# Patient Record
Sex: FEMALE | Race: WHITE
[De-identification: names, ages, dates, MRNs, and addresses within clinical notes are randomized per-mention and may not be internally consistent; named-entity substitution may affect disease eponyms.]

---

## 2017-04-23 ENCOUNTER — HOSPITAL ENCOUNTER (INPATIENT)
Dept: HOSPITAL 25 - ED | Age: 72
LOS: 2 days | Discharge: HOME | DRG: 188 | End: 2017-04-25
Attending: INTERNAL MEDICINE | Admitting: HOSPITALIST
Payer: MEDICARE

## 2017-04-23 DIAGNOSIS — Z91.013: ICD-10-CM

## 2017-04-23 DIAGNOSIS — J90: Primary | ICD-10-CM

## 2017-04-23 DIAGNOSIS — C50.919: ICD-10-CM

## 2017-04-23 DIAGNOSIS — Z80.1: ICD-10-CM

## 2017-04-23 DIAGNOSIS — Z82.5: ICD-10-CM

## 2017-04-23 DIAGNOSIS — E03.9: ICD-10-CM

## 2017-04-23 DIAGNOSIS — I10: ICD-10-CM

## 2017-04-23 DIAGNOSIS — Z79.899: ICD-10-CM

## 2017-04-23 DIAGNOSIS — E86.0: ICD-10-CM

## 2017-04-23 DIAGNOSIS — Z88.1: ICD-10-CM

## 2017-04-23 DIAGNOSIS — Z91.09: ICD-10-CM

## 2017-04-23 DIAGNOSIS — Z87.891: ICD-10-CM

## 2017-04-23 DIAGNOSIS — R00.0: ICD-10-CM

## 2017-04-23 DIAGNOSIS — J44.9: ICD-10-CM

## 2017-04-23 LAB
ADD DIFF/SLIDE REVIEW?: (no result)
ALBUMIN SERPL BCG-MCNC: 3.6 G/DL (ref 3.2–5.2)
ALP SERPL-CCNC: 73 U/L (ref 34–104)
ALT SERPL W P-5'-P-CCNC: 17 U/L (ref 7–52)
ANION GAP SERPL CALC-SCNC: 9 MMOL/L (ref 2–11)
AST SERPL-CCNC: 24 U/L (ref 13–39)
BUN SERPL-MCNC: 19 MG/DL (ref 6–24)
BUN/CREAT SERPL: 20 (ref 8–20)
CALCIUM SERPL-MCNC: 9.3 MG/DL (ref 8.6–10.3)
CHLORIDE SERPL-SCNC: 99 MMOL/L (ref 101–111)
GLOBULIN SER CALC-MCNC: 3.1 G/DL (ref 2–4)
GLUCOSE SERPL-MCNC: 163 MG/DL (ref 70–100)
HCO3 SERPL-SCNC: 29 MMOL/L (ref 22–32)
HCT VFR BLD AUTO: 33 % (ref 35–47)
HGB BLD-MCNC: 11.4 G/DL (ref 12–16)
MCH RBC QN AUTO: 35 PG (ref 27–31)
MCHC RBC AUTO-ENTMCNC: 34 G/DL (ref 31–36)
MCV RBC AUTO: 101 FL (ref 80–97)
POTASSIUM SERPL-SCNC: 2.9 MMOL/L (ref 3.5–5)
PROT SERPL-MCNC: 6.7 G/DL (ref 6.4–8.9)
RBC # BLD AUTO: 3.29 10^6/UL (ref 4–5.4)
SODIUM SERPL-SCNC: 137 MMOL/L (ref 133–145)
WBC # BLD AUTO: 7.7 10^3/UL (ref 3.5–10.8)

## 2017-04-23 PROCEDURE — G0378 HOSPITAL OBSERVATION PER HR: HCPCS

## 2017-04-23 PROCEDURE — 88342 IMHCHEM/IMCYTCHM 1ST ANTB: CPT

## 2017-04-23 PROCEDURE — 99232 SBSQ HOSP IP/OBS MODERATE 35: CPT

## 2017-04-23 PROCEDURE — 84157 ASSAY OF PROTEIN OTHER: CPT

## 2017-04-23 PROCEDURE — 83615 LACTATE (LD) (LDH) ENZYME: CPT

## 2017-04-23 PROCEDURE — 85025 COMPLETE CBC W/AUTO DIFF WBC: CPT

## 2017-04-23 PROCEDURE — 71010: CPT

## 2017-04-23 PROCEDURE — 94640 AIRWAY INHALATION TREATMENT: CPT

## 2017-04-23 PROCEDURE — 80048 BASIC METABOLIC PNL TOTAL CA: CPT

## 2017-04-23 PROCEDURE — 93005 ELECTROCARDIOGRAM TRACING: CPT

## 2017-04-23 PROCEDURE — 89051 BODY FLUID CELL COUNT: CPT

## 2017-04-23 PROCEDURE — 83986 ASSAY PH BODY FLUID NOS: CPT

## 2017-04-23 PROCEDURE — 88305 TISSUE EXAM BY PATHOLOGIST: CPT

## 2017-04-23 PROCEDURE — 87040 BLOOD CULTURE FOR BACTERIA: CPT

## 2017-04-23 PROCEDURE — 87070 CULTURE OTHR SPECIMN AEROBIC: CPT

## 2017-04-23 PROCEDURE — 87205 SMEAR GRAM STAIN: CPT

## 2017-04-23 PROCEDURE — 88112 CYTOPATH CELL ENHANCE TECH: CPT

## 2017-04-23 PROCEDURE — 94760 N-INVAS EAR/PLS OXIMETRY 1: CPT

## 2017-04-23 PROCEDURE — 32555 ASPIRATE PLEURA W/ IMAGING: CPT

## 2017-04-23 PROCEDURE — 36415 COLL VENOUS BLD VENIPUNCTURE: CPT

## 2017-04-23 PROCEDURE — 99239 HOSP IP/OBS DSCHRG MGMT >30: CPT

## 2017-04-23 PROCEDURE — 83735 ASSAY OF MAGNESIUM: CPT

## 2017-04-23 PROCEDURE — 80053 COMPREHEN METABOLIC PANEL: CPT

## 2017-04-23 PROCEDURE — 85610 PROTHROMBIN TIME: CPT

## 2017-04-23 PROCEDURE — 88341 IMHCHEM/IMCYTCHM EA ADD ANTB: CPT

## 2017-04-23 PROCEDURE — 71020: CPT

## 2017-04-23 PROCEDURE — 83930 ASSAY OF BLOOD OSMOLALITY: CPT

## 2017-04-23 RX ADMIN — ALBUTEROL SULFATE PRN MG: 2.5 SOLUTION RESPIRATORY (INHALATION) at 15:20

## 2017-04-23 RX ADMIN — MOMETASONE FUROATE SCH PUFF: 220 INHALANT RESPIRATORY (INHALATION) at 19:30

## 2017-04-23 RX ADMIN — SODIUM CHLORIDE SCH MLS/HR: 900 IRRIGANT IRRIGATION at 15:52

## 2017-04-23 RX ADMIN — BENZONATATE PRN MG: 100 CAPSULE ORAL at 20:06

## 2017-04-23 RX ADMIN — HEPARIN SODIUM SCH UNITS: 5000 INJECTION INTRAVENOUS; SUBCUTANEOUS at 21:33

## 2017-04-23 NOTE — HP
*** AMENDED REPORT NOW INCLUDES COSIGNER ***



HISTORY AND PHYSICAL:

 

DATE OF ADMISSION:  17

 

PRIMARY CARE PROVIDER:  Dr. Bains.

 

CONSULTING HEMATOLOGIST:  Dr. Pearson.



ATTENDING PHYSICIAN:  David Pickering MD * (dictated by ROXANNA Valdivia PP

 

CHIEF COMPLAINT:

1.  Shortness of breath.

2.  Cough.

 

HISTORY OF PRESENT ILLNESS:  Ms. Stewart is a 72-year-old female patient who has a 
history of hypothyroidism, hypertension, breast cancer with recurrence, and 
pleural effusion.  She has a history of COPD as well.  The patient comes in 
today and says since February she has been having progressive worsening 
shortness of breath, but over the last 3 days, she noticed that it was much 
worse.  On the , she was here.  A PowerPort  was placed and she was started 
on IV chemotherapy again for recurrence of breast cancer in the form of Taxol.  
She underwent the therapy, did well; however, over the last 48 to 72 hours, she 
has had progressive worsening shortness of breath.  She has been coughing.  She 
has noticed that with minimal exertion, just standing up, she becomes short of 
breath.  She was in close contact with Dr. Pearson.  She touched base with him 
today and he was concerned and felt that the patient should be evaluated in the 
ER and possibly set up for thoracentesis. The patient states that she has not 
been having any fevers or chills.  She denied having any chest discomfort.  She 
denied having any abdominal discomfort and denied having any chest pain, and 
again, her biggest complaint was shortness of breath and there was no 
orthopnea.  She came to the ER.  She was evaluated.  Chest x-ray did show 
increasing size of pleural effusion and the hospitalist service was asked to 
evaluate for admission.

 

PAST MEDICAL HISTORY:  Significant for:

1.  Hypothyroidism.

2.  Hypertension.

3.  Breast cancer.

4.  COPD.

5.  Pleural effusion.

 

PAST SURGICAL HISTORY:

1.  She has had a mastectomy.

2.  .

 

HOME MEDICATIONS:  Include:

1.  Zofran 4 mg every 6 hours as needed.

2.  Asmanex 1 puff inhaled b.i.d.

3.  Albuterol 1 neb inhaled q.i.d.

4.  Dyazide 1 capsule p.o. daily.

5.  Metoprolol 25 mg daily.

6.  Synthroid 100 mcg daily.

7.  Albuterol 2 puffs every 4 hours as needed.

 

ALLERGIES TO MEDICATIONS:  Include SHELLFISH, LEVAQUIN, and DARVOCET.

 

FAMILY HISTORY:  Mother had a history of lung cancer.  Father had a history of 
CHF.

 

SOCIAL HISTORY:  She is a former smoker.  She quit over 40 years ago.  She does 
not drink alcohol.  Surrogate decision maker is her  and daughter.

 

REVIEW OF SYSTEMS:  There is no documented fever.  She denied any weight 
change. No double vision.  No ear discharge.  No rhinorrhea.  No sore throat.  
There is a cough that is productive of white sputum.  She denies having any 
again chest discomfort.  No abdominal pain.  No nausea.  No vomiting.  No 
dysuria.  No frequency.  She denied having any loss of consciousness.  Review 
of 14 systems completed, all others negative.

 

                               PHYSICAL EXAMINATION

 

GENERAL:  At this time, Ms. Stewart is a 72-year-old female patient.  She is 
sitting in the ER stretcher.  She does not appear to be in any acute 
respiratory distress. She is awake and she is alert.

 

VITAL SIGNS:  Blood pressure 134/77, pulse of 114, respirations 20, O2 sat 98%, 
and her temperature was 97.4.

 

HEENT:  Head atraumatic.  Eyes:  EOMs intact.  Sclerae are anicteric and not 
pale. Throat:  Oral mucosa appears to be moist.  No oropharyngeal erythema.

 

NECK:  Supple.

 

LUNGS:  She was diminished in the bases.  Equal diaphragmatic expansion.

 

HEART:  Sounds S1, S2.  Regular rate and rhythm.  She is tachycardic.

 

ABDOMEN:  Soft, flat, nontender.  Bowel sounds were present.

 

EXTREMITIES:  Pulses were 2+ throughout.  She is able to move all 4 extremities 
with 5/5 strength.

 

NEUROLOGIC:  The patient is awake, she is alert, and she is oriented x3.  
Tongue midline.   are equal.  No gross focal deficits.

 

SKIN:  Intact.

 

 LABORATORY DATA AND DIAGNOSTIC STUDIES:  Today are pending, but she did have 
labs on the , revealed WBC of 8.0, RBC of 3.36, hemoglobin 11.8, hematocrit 
34, platelet count of 358.  The sodium was 138, potassium 3.2, chloride of 102, 
bicarb 26,  BUN 15, creatinine of 0.87, glucose 122.

 

There was a chest x-ray obtained today, which impression read increasing 
pleural effusion, Port-A-Cath in place.  Bilateral pleural effusions were noted 
which are increasing in size since  exam.

 

She had a CTA of the chest on  which showed no evidence of PE. 
Bronchiectasis in the anterior right upper lobe.  Bilateral pleural effusions 
are present.  Left effusion appears to be new since previous exam.  Right lung 
base laterally is new consolidated soft tissue for which an underlying mass is 
nonexclusive, measuring up to 2.1 cm.  Old medical records were reviewed.

 

ASSESSMENT AND PLAN:  Ms. Stewart is a 72-year-old female patient coming in to the 
ER today with progressive worsening shortness of breath over the last 48 to 72 
hours. On evaluation today, it was noted that pleural effusions were increasing 
in size and we were asked to evaluate for admission.  She will be admitted 
under observation status for:

 

1.  Pleural effusion:  At this point, I did set up for a thoracentesis.  I will 
send the pleural fluid off for studies.  We will check for protein, LDH, pH; in 
addition to this, cultures and cell count.  We should send that off for 
cytology. Dr. Pearson will be evaluating the patient tomorrow and they could send 
that off for any staining that they would want done.  For the time being, we 
will follow.

2.  Tachycardia:  The etiology is unclear.  It could be dehydration.  She 
recently had chemo.  She has been taking her Dyazide as prescribed.  So, I am 
going to give her fluids.  I am waiting for labs today.  We will get a CBC and 
a CMP to start, and I am trying to get an EKG as well.

3.  Hypothyroidism:  Continue her Synthroid.

4.  Hypertension:  Continue her metoprolol XL only and I am going to hold the 
Dyazide.

5.  Breast cancer:  Again, Dr. Pearson will be evaluating.

6.  Question of chronic obstructive pulmonary disease:  She is on Asmanex and 
standing nebs.  We will continue.

7.  DVT prophylaxis:  She is high risk.  I will place her on heparin subcu.

8.  Code status:  She wishes to be a full code.

9.  Fluids, electrolytes, and nutrition:  She can have a regular diet and 
n.p.o. after midnight.

 

TIME SPENT:  Time spent on the admission was approximately 60 minutes; greater 
than half the time was spent face-to-face with the patient obtaining my history 
and physical, other half the time spent going over the plan of care with the 
patient and implementing plan of care.  I did discuss the plan of care with my 
attending, Dr. Pickering; he is in agreement.

 

 ____________________________________ FIGUEROA SANTOS NP



CC:  Dr. Bians; Dr. Pearson*

 

[id number]/CPS #: [id number]

[facility]

## 2017-04-23 NOTE — ED
Shortness of Breath





- HPI Summary


HPI Summary: 





71 y/o patient with h/o SOB, breast CA currently undergoing chemo, recent port 

placement  with increasing SOB x 1 week, patient unable to walk across room 

without feeling weak/ SOB,   no chest pain, unable to take deep breath, only 

shallow breathing, + coughing productive with white phlegm.  no fever, chills.  

+ wekaness fatigue from chemo.   no tob use.   spoke with Dr. Pearson this AM.   





- History of Current Complaint


Chief Complaint: EDShortnessOfBreath


Time Seen by Provider: 17 10:28


Hx Obtained From: Patient


Onset/Duration: Gradual Onset, Lasting Weeks, Still Present, Worse Since - past 

week


Dyspnea At: Exertion


Aggrevating Factors: Movement, Deep Breaths


Alleviating Factors: Bronchodilators, Oxygen, Upright Position


Associated Signs & Symptoms: Cough (Productive)





- Risk Factors


Cardiac: Negative





- Allergy/Home Medications


Allergies/Adverse Reactions: 


 Allergies











Allergy/AdvReac Type Severity Reaction Status Date / Time


 


Propoxyphene [From Darvon] Allergy Intermediate Hives Verified 17 10:05


 


Shellfish Allergy Allergy Intermediate Hives Verified 17 10:05


 


Levofloxacin Allergy  Unknown Verified 17 10:05





   Reaction  





   Details  











Home Medications: 


 Home Medications





Mometasone 220 MCG MDI * [Asmanex 220 MCG MDI *] 1 puff INH BID 17 [

History Confirmed 17]


Ondansetron TAB* [Zofran 4 MG Tab*] 4 mg PO Q6H PRN 17 [History Confirmed 

17]











PMH/Surg Hx/FS Hx/Imm Hx


Previously Healthy: No - chem tx for breast CA, prior radiation  


Endocrine/Hematology History: Reports: Hx Thyroid Disease


   Denies: Hx Diabetes


Cardiovascular History: Reports: Hx Hypertension


Respiratory History: Reports: Hx Pleural Effusion, Hx Pneumonia - several times 

after chemo, Other Respiratory Problems/Disorders - BREAST CA. PLEURAL EFFUSION


 History: 


   Denies: Hx Renal Disease


Sensory History: Reports: Hx Contacts or Glasses - reading


Opthamlomology History: Reports: Hx Contacts or Glasses - reading


Psychiatric History: 


   Comment Only: Other Psychiatric Issues/Disorders - clostrophobic





- Cancer History


Cancer Type, Location and Year: Right Breast, , Dr. Mao Bajwa Chemotherapy: Yes


Hx Radiation Therapy: Yes


Hx Palliative Cancer Treatment: No





- Surgical History


Surgery Procedure, Year, and Place: rt mastectomy ; 


Hx Anesthesia Reactions: No


Infectious Disease History: 


   Denies: History Other Infectious Disease, Traveled Outside the [place] in Last 30 

Days





- Family History


Known Family History: Positive: None





- Social History


Alcohol Use: None


Substance Use Type: Reports: None


Smoking Status (MU): Former Smoker


Have You Smoked in the Last Year: No





Review of Systems


Constitutional: Negative


Eyes: Negative


ENT: Negative


Cardiovascular: Negative


Positive: Shortness Of Breath, Cough


Gastrointestinal: Negative


Genitourinary: Negative


Musculoskeletal: Negative


Skin: Negative


Positive: Weakness


Psychological: Normal


All Other Systems Reviewed And Are Negative: Yes





Physical Exam


Triage Information Reviewed: Yes


Vital Signs On Initial Exam: 


 Initial Vitals











Temp Pulse Resp BP Pulse Ox


 


 97.4 F   117   16   117/69   95 


 


 17 10:06  17 10:06  17 10:06  17 10:06  17 10:06











Vital Signs Reviewed: Yes


Appearance: Positive: No Pain Distress, Well-Nourished, Ill-Appearing, Thin


Skin: Positive: Warm, Skin Color Reflects Adequate Perfusion


Head/Face: Positive: Normal Head/Face Inspection


Eyes: Positive: Normal, EOMI, ARIANE


ENT: Positive: Normal ENT inspection


Neck: Positive: Supple, Nontender, No Lymphadenopathy


Respiratory/Lung Sounds: Positive: Clear to Auscultation, Other - decreased 

breath sounds b/l LLs, some wheezing/ crackles RAINER


Cardiovascular: Positive: Normal, Pulses are Symmetrical in both Upper and 

Lower Extremities, Tachycardia - mild


Musculoskeletal: Positive: Normal, Strength/ROM Intact - grossly intact


Neurological: Positive: Normal, Sensory/Motor Intact, Alert, Oriented to Person 

Place, Time, Facial Symmetry, Speech Normal


Psychiatric: Positive: Normal


AVPU Assessment: Alert





- [place] Coma Scale


Coma Scale Total: 15





Diagnostics





- Vital Signs


 Vital Signs











  Temp Pulse Resp BP Pulse Ox


 


 17 10:26  97.4 F  114  18  117/69  94


 


 17 10:06  97.4 F  117  16  117/69  95














- Laboratory


Lab Statement: Any lab studies that have been ordered have been reviewed, and 

results considered in the medical decision making process.





Course/Dx





- Course


Course Of Treatment: CXR + for increasing b/l pleural effusions, discussed with 

DR. Pearson, admit overnight, chest tube likely in AM, admitted to hospitalist





- Diagnoses


Provider Diagnoses: 


 Pleural effusion, bilateral








Discharge





- Discharge Plan


Condition: Guarded


Disposition: ADMITTED TO [facility]

## 2017-04-23 NOTE — RAD
Indication: Shortness of breath, port placement.



2 views of the chest including dual energy PA views demonstrate no mediastinal shift.

Heart is of normal size and configuration. Bilateral pleural effusions are noted which is

increasing since March 28, 2017. No pneumothorax is noted. Chronic interstitial disease is

noted.



IMPRESSION: Increasing pleural effusion. Portacatheter in place.

## 2017-04-24 LAB
ANION GAP SERPL CALC-SCNC: 10 MMOL/L (ref 2–11)
BUN SERPL-MCNC: 20 MG/DL (ref 6–24)
BUN/CREAT SERPL: 29 (ref 8–20)
CALCIUM SERPL-MCNC: 8.8 MG/DL (ref 8.6–10.3)
CHLORIDE SERPL-SCNC: 103 MMOL/L (ref 101–111)
GLUCOSE SERPL-MCNC: 144 MG/DL (ref 70–100)
HCO3 SERPL-SCNC: 24 MMOL/L (ref 22–32)
HCT VFR BLD AUTO: 31 % (ref 35–47)
HGB BLD-MCNC: 10.7 G/DL (ref 12–16)
MCH RBC QN AUTO: 35 PG (ref 27–31)
MCHC RBC AUTO-ENTMCNC: 34 G/DL (ref 31–36)
MCV RBC AUTO: 101 FL (ref 80–97)
POTASSIUM SERPL-SCNC: 3.3 MMOL/L (ref 3.5–5)
RBC # BLD AUTO: 3.1 10^6/UL (ref 4–5.4)
SODIUM SERPL-SCNC: 137 MMOL/L (ref 133–145)
WBC # BLD AUTO: 6.7 10^3/UL (ref 3.5–10.8)

## 2017-04-24 PROCEDURE — 0W9B3ZX DRAINAGE OF LEFT PLEURAL CAVITY, PERCUTANEOUS APPROACH, DIAGNOSTIC: ICD-10-PCS

## 2017-04-24 RX ADMIN — BENZONATATE PRN MG: 100 CAPSULE ORAL at 00:35

## 2017-04-24 RX ADMIN — ALBUTEROL SULFATE SCH: 2.5 SOLUTION RESPIRATORY (INHALATION) at 07:53

## 2017-04-24 RX ADMIN — ALBUTEROL SULFATE PRN MG: 2.5 SOLUTION RESPIRATORY (INHALATION) at 11:51

## 2017-04-24 RX ADMIN — HEPARIN SODIUM SCH UNITS: 5000 INJECTION INTRAVENOUS; SUBCUTANEOUS at 15:42

## 2017-04-24 RX ADMIN — SODIUM CHLORIDE SCH: 900 IRRIGANT IRRIGATION at 07:45

## 2017-04-24 RX ADMIN — BENZONATATE PRN MG: 100 CAPSULE ORAL at 20:59

## 2017-04-24 RX ADMIN — MOMETASONE FUROATE SCH PUFF: 220 INHALANT RESPIRATORY (INHALATION) at 21:03

## 2017-04-24 RX ADMIN — MOMETASONE FUROATE SCH PUFF: 220 INHALANT RESPIRATORY (INHALATION) at 09:28

## 2017-04-24 RX ADMIN — ALBUTEROL SULFATE SCH MG: 2.5 SOLUTION RESPIRATORY (INHALATION) at 15:08

## 2017-04-24 RX ADMIN — HEPARIN SODIUM SCH UNITS: 5000 INJECTION INTRAVENOUS; SUBCUTANEOUS at 20:59

## 2017-04-24 RX ADMIN — BENZONATATE PRN MG: 100 CAPSULE ORAL at 12:57

## 2017-04-24 RX ADMIN — ALBUTEROL SULFATE SCH MG: 2.5 SOLUTION RESPIRATORY (INHALATION) at 21:03

## 2017-04-24 RX ADMIN — METOPROLOL SUCCINATE SCH MG: 25 TABLET, EXTENDED RELEASE ORAL at 14:53

## 2017-04-24 RX ADMIN — ALBUTEROL SULFATE SCH: 2.5 SOLUTION RESPIRATORY (INHALATION) at 11:44

## 2017-04-24 RX ADMIN — POTASSIUM CHLORIDE SCH MEQ: 1500 TABLET, EXTENDED RELEASE ORAL at 20:59

## 2017-04-24 RX ADMIN — HEPARIN SODIUM SCH UNITS: 5000 INJECTION INTRAVENOUS; SUBCUTANEOUS at 06:01

## 2017-04-24 RX ADMIN — LEVOTHYROXINE SODIUM SCH MCG: 100 TABLET ORAL at 06:02

## 2017-04-24 NOTE — RAD
INDICATION: Left thoracentesis     



COMPARISON: April 23, 2017

 

TECHNIQUE: PA dual-energy views were obtained.



FINDINGS: 



Bones/Soft Tissues: There are no acute bony findings. There is a left-sided PowerPort

catheter, unchanged. There is right mastectomy



Cardiomediastinal: The heart is normal in size. 



Lungs: There is persistent mild volume loss in right hemothorax. There is no left-sided

pneumothorax post thoracentesis.



Pleura: There are small bilateral pleural effusions. Left pleural effusion is smaller post

thoracentesis. 



Other: None



IMPRESSION: Left-sided effusion is smaller. There is no evidence of pneumothorax. The

examination is otherwise unchanged

## 2017-04-24 NOTE — RAD
Indication: Left pleural effusion.



Real-time sonography of the left chest was performed.



Using usual aseptic technique and lidocaine as local anesthetic the left pleural space was

entered with a 5-Ind[place] catheter. Approximately 900 mL of frances fluid was aspirated.



IMPRESSION: Successful aspiration of 900 mL of frances fluid from the left hemithorax.

## 2017-04-25 VITALS — SYSTOLIC BLOOD PRESSURE: 108 MMHG | DIASTOLIC BLOOD PRESSURE: 52 MMHG

## 2017-04-25 LAB
ANION GAP SERPL CALC-SCNC: 9 MMOL/L (ref 2–11)
BUN SERPL-MCNC: 16 MG/DL (ref 6–24)
BUN/CREAT SERPL: 23.9 (ref 8–20)
CALCIUM SERPL-MCNC: 8.6 MG/DL (ref 8.6–10.3)
CHLORIDE SERPL-SCNC: 104 MMOL/L (ref 101–111)
GLUCOSE SERPL-MCNC: 117 MG/DL (ref 70–100)
HCO3 SERPL-SCNC: 23 MMOL/L (ref 22–32)
HCT VFR BLD AUTO: 30 % (ref 35–47)
HGB BLD-MCNC: 10.3 G/DL (ref 12–16)
Lab: 286 MOSM/KG
MAGNESIUM SERPL-MCNC: 1.8 MG/DL (ref 1.9–2.7)
MCH RBC QN AUTO: 35 PG (ref 27–31)
MCHC RBC AUTO-ENTMCNC: 35 G/DL (ref 31–36)
MCV RBC AUTO: 101 FL (ref 80–97)
POTASSIUM SERPL-SCNC: 3.7 MMOL/L (ref 3.5–5)
PROT FLD-MCNC: 4.3 G/DL
RBC # BLD AUTO: 2.95 10^6/UL (ref 4–5.4)
SODIUM SERPL-SCNC: 136 MMOL/L (ref 133–145)
WBC # BLD AUTO: 7.5 10^3/UL (ref 3.5–10.8)

## 2017-04-25 RX ADMIN — ALBUTEROL SULFATE SCH MG: 2.5 SOLUTION RESPIRATORY (INHALATION) at 08:10

## 2017-04-25 RX ADMIN — LEVOTHYROXINE SODIUM SCH MCG: 100 TABLET ORAL at 05:59

## 2017-04-25 RX ADMIN — HEPARIN SODIUM SCH UNITS: 5000 INJECTION INTRAVENOUS; SUBCUTANEOUS at 05:59

## 2017-04-25 RX ADMIN — POTASSIUM CHLORIDE SCH MEQ: 1500 TABLET, EXTENDED RELEASE ORAL at 09:20

## 2017-04-25 RX ADMIN — ALBUTEROL SULFATE SCH MG: 2.5 SOLUTION RESPIRATORY (INHALATION) at 12:54

## 2017-04-25 RX ADMIN — MOMETASONE FUROATE SCH PUFF: 220 INHALANT RESPIRATORY (INHALATION) at 08:10

## 2017-04-25 RX ADMIN — METOPROLOL SUCCINATE SCH MG: 25 TABLET, EXTENDED RELEASE ORAL at 09:20

## 2017-04-25 NOTE — DS
DISCHARGE SUMMARY:

 

DATE OF ADMISSION:  04/23/17

 

DATE OF DISCHARGE:  04/25/17

 

ATENDING PHYSICIAN:  Aakash Chavira MD (dictated by Serina Yu NP)



DISCHARGE DIAGNOSES:

1.  Shortness of breath:  Secondary to pleural effusion, improved, status post 
thoracentesis.

2.  Metastatic breast cancer:  Pathology of effusion pending, however, 
currently on therapy.

3.  Hypothyroidism:  Stable on medication.

4.  Hypertension:  Stable on medications.

 

DISCHARGE MEDICATIONS:

1.  Albuterol 2.5 mg nebulizers inhaled 4 times a day p.r.n. shortness of 
breath.

2.  Acetaminophen 650 mg p.o. q.4 hours p.r.n. pain or fever.

3.  Benzonatate 100 mg p.o. t.i.d. p.r.n. cough.

4.  EMLA cream apply to port 30 to 60 minutes prior to access p.r.n.

5.  Potassium chloride 20 mEq p.o. b.i.d.

6.  Stop diuretic.

 

HOSPITAL COURSE:  Please see admission note for full H and P; however, briefly, 
Ms. Stewart is well known to our service due to her unfortunate diagnosis of 
metastatic breast cancer diagnosed initially in 2013 with malignant pleural 
effusion.  She had subsequently been treated with hormone based therapy most 
recently on Ibrance. Unfortunately, over the last several months, she has 
complained of increasing shortness of breath and cough initially treated as an 
infection, but with CT scan showing pleural recurrence and now on weekly Taxol, 
status post cycle one on 04/20/17.  Ms. Stewart presented to the ER on 04/23/17 
with increasing shortness of breath and difficulty breathing.  She was admitted 
for thoracentesis and monitoring.  During admission, she had mild tachycardia, 
unclear origin, however, component felt to be related to compensation and 
dehydration.  This is partially improved as she remained somewhat tachycardic 
with exertion.  Thoracentesis was performed on 04/24/17, procedure was 
tolerated very well.  A total of 900 mL of frances colored fluid was removed from 
the left pleural space.  Currently cultures and pathology are pending.  Ms. Stewart notes marked improvement since thoracentesis and very much would like to 
go home.  Her oxygen saturations are stable on room air and while she was short 
of breath with exertion and mild associated tachycardia, she feels very good 
overall.  She would be discharged home today and follow up in our office on 04/
27/17 for planned cycle 1, day 8 Taxol, as well as follow up with myself MARY Yu NP, for followup from hospitalization.  Plan of care was reviewed at 
length with Ms. Stewart, who denies further questions.

 

TIME SPENT:  Greater than 40 minutes were spent with greater than 50% in face-to
- face counseling.

 

____________________________________ SERIAN YU NP



CC:  Dr. Bains*

 

[id number]/CPS #: [id number]

[facility]

## 2017-04-26 LAB — PH FLD: 7.6 [PH]

## 2017-12-22 ENCOUNTER — HOSPITAL ENCOUNTER (EMERGENCY)
Dept: HOSPITAL 25 - ED | Age: 72
Discharge: HOME | End: 2017-12-22
Payer: MEDICARE

## 2017-12-22 VITALS — DIASTOLIC BLOOD PRESSURE: 72 MMHG | SYSTOLIC BLOOD PRESSURE: 147 MMHG

## 2017-12-22 DIAGNOSIS — I10: ICD-10-CM

## 2017-12-22 DIAGNOSIS — R07.9: Primary | ICD-10-CM

## 2017-12-22 DIAGNOSIS — Z87.891: ICD-10-CM

## 2017-12-22 DIAGNOSIS — C50.919: ICD-10-CM

## 2017-12-22 DIAGNOSIS — J90: ICD-10-CM

## 2017-12-22 LAB
ADD DIFF/SLIDE REVIEW?: (no result)
ALBUMIN SERPL BCG-MCNC: 4.1 G/DL (ref 3.2–5.2)
ALP SERPL-CCNC: 69 U/L (ref 34–104)
ALT SERPL W P-5'-P-CCNC: 18 U/L (ref 7–52)
ANION GAP SERPL CALC-SCNC: 8 MMOL/L (ref 2–11)
AST SERPL-CCNC: 21 U/L (ref 13–39)
BUN SERPL-MCNC: 14 MG/DL (ref 6–24)
BUN/CREAT SERPL: 17.5 (ref 8–20)
CALCIUM SERPL-MCNC: 9.4 MG/DL (ref 8.6–10.3)
CHLORIDE SERPL-SCNC: 104 MMOL/L (ref 101–111)
GLOBULIN SER CALC-MCNC: 2.8 G/DL (ref 2–4)
GLUCOSE SERPL-MCNC: 127 MG/DL (ref 70–100)
HCO3 SERPL-SCNC: 24 MMOL/L (ref 22–32)
HCT VFR BLD AUTO: 36 % (ref 35–47)
HGB BLD-MCNC: 12.2 G/DL (ref 12–16)
MCH RBC QN AUTO: 30 PG (ref 27–31)
MCHC RBC AUTO-ENTMCNC: 34 G/DL (ref 31–36)
MCV RBC AUTO: 89 FL (ref 80–97)
POTASSIUM SERPL-SCNC: 4.4 MMOL/L (ref 3.5–5)
PROT SERPL-MCNC: 6.9 G/DL (ref 6.4–8.9)
RBC # BLD AUTO: 4.03 10^6/UL (ref 4–5.4)
SODIUM SERPL-SCNC: 136 MMOL/L (ref 133–145)
TROPONIN I SERPL-MCNC: 0.01 NG/ML (ref ?–0.04)
WBC # BLD AUTO: 6.3 10^3/UL (ref 3.5–10.8)

## 2017-12-22 PROCEDURE — 86140 C-REACTIVE PROTEIN: CPT

## 2017-12-22 PROCEDURE — 99283 EMERGENCY DEPT VISIT LOW MDM: CPT

## 2017-12-22 PROCEDURE — 85025 COMPLETE CBC W/AUTO DIFF WBC: CPT

## 2017-12-22 PROCEDURE — 83880 ASSAY OF NATRIURETIC PEPTIDE: CPT

## 2017-12-22 PROCEDURE — 85379 FIBRIN DEGRADATION QUANT: CPT

## 2017-12-22 PROCEDURE — 84484 ASSAY OF TROPONIN QUANT: CPT

## 2017-12-22 PROCEDURE — 80053 COMPREHEN METABOLIC PANEL: CPT

## 2017-12-22 PROCEDURE — 71020: CPT

## 2017-12-22 PROCEDURE — 36415 COLL VENOUS BLD VENIPUNCTURE: CPT

## 2017-12-22 NOTE — RAD
INDICATION:  Cough.



COMPARISON:  Comparison is made with a prior chest x-ray study from May 02, 2017.



TECHNIQUE: Dual-energy PA  and lateral views of the chest were obtained.



FINDINGS:   There is a power port central venous catheter entering on the left side. The

catheter tip projects overlying the superior vena cava. The heart is mildly enlarged and

unchanged from the prior exam.



The lungs are hyperinflated. There is mild diffuse prominence of the interstitial markings

and small bilateral pleural effusions most consistent with congestive heart failure. These

findings appear similar to the prior exam. 



IMPRESSION:  FINDINGS MOST CONSISTENT WITH CONGESTIVE HEART FAILURE.

## 2017-12-22 NOTE — ED
Roc BARRON Natalie, scribed for Prashant Oneil MD on 17 at 0857 .





Shortness of Breath





- HPI Summary


HPI Summary: 


The pt is a 71 y/o F presenting to the ED c/o fullness in right chest and lung 

area with gradual onset over past week. The pain is aggravated by nothing and 

is alleviated by nothing. The pt additionally c/o SOB, nonproductive croupy 

coughing and increased mucous in mouth. The pt denies nothing. She received a 

chemotherapy 2L saline treatment on 17, and she has been getting them 

since May.





- History of Current Complaint


Chief Complaint: EDShortnessOfBreath


Time Seen by Provider: 17 08:28


Hx Obtained From: Patient


Onset/Duration: Gradual Onset, Lasting Days


Current Severity: Moderate


Aggrevating Factors: Nothing


Alleviating Factors: Nothing


Associated Signs & Symptoms: Cough (Nonproductive) - croupy





- Allergy/Home Medications


Allergies/Adverse Reactions: 


 Allergies











Allergy/AdvReac Type Severity Reaction Status Date / Time


 


Propoxyphene [From Darvon] Allergy Intermediate Hives Verified 17 14:54


 


Shellfish Allergy Allergy Intermediate Hives Verified 17 14:54


 


Levofloxacin Allergy  Unknown Verified 17 14:54





   Reaction  





   Details  














PMH/Surg Hx/FS Hx/Imm Hx


Previously Healthy: No


Endocrine/Hematology History: Reports: Hx Thyroid Disease


   Denies: Hx Diabetes


Cardiovascular History: Reports: Hx Hypertension


Respiratory History: Reports: Hx Pleural Effusion, Hx Pneumonia - several times 

after chemo, Other Respiratory Problems/Disorders - BREAST CA. PLEURAL EFFUSION


 History: 


   Denies: Hx Renal Disease


Sensory History: Reports: Hx Contacts or Glasses - reading


   Denies: Hx Hearing Aid


Opthamlomology History: Reports: Hx Contacts or Glasses - reading


Psychiatric History: 


   Comment Only: Other Psychiatric Issues/Disorders - clostrophobic





- Cancer History


Cancer Type, Location and Year: Right Breast, , Dr. Mao Bajwa Chemotherapy: Yes


Hx Radiation Therapy: Yes


Hx Palliative Cancer Treatment: No





- Surgical History


Surgery Procedure, Year, and Place: rt mastectomy ; 


Hx Anesthesia Reactions: No


Infectious Disease History: No


Infectious Disease History: 


   Denies: History Other Infectious Disease, Traveled Outside the [place] in Last 30 

Days





- Family History


Known Family History: 


   Negative: Cardiac Disease, Hypertension, Diabetes





- Social History


Alcohol Use: None


Substance Use Type: Reports: None


Smoking Status (MU): Former Smoker


Have You Smoked in the Last Year: No





Review of Systems


Negative: Fever


Positive: Other - increased mucous in mouth


Positive: Shortness Of Breath, Cough - croupy, nonproductive, Other - fullness 

in right chest and lung area


All Other Systems Reviewed And Are Negative: Yes





Physical Exam





- Summary


Physical Exam Summary: 


Appearance: The patient is well-nourished in no acute distress and in no acute 

pain.


Skin: The skin is warm and dry and skin color reflects adequate perfusion.


HEENT: The head is normocephalic and atraumatic. The pupils are equal and 

reactive. The conjunctivae are clear and without drainage. Nares are patent and 

without drainage. Mouth reveals moist mucous membranes and the throat is 

without erythema and exudate. The external ears are intact. The ear canals are 

patent and without drainage. The tympanic membranes are intact.


Neck: The neck is supple with full range of motion and non-tender. There are no 

carotid bruits. There is no neck vein distension.


Respiratory: Chest is non-tender. Lungs are clear to auscultation. The patient 

has decreased breath signs on the right.


Cardiovascular: Heart is regular rate and rhythm. There is no murmur or rub 

auscultated. There is no peripheral edema and pulses are symmetrical and equal.


Abdomen: The abdomen is soft and non-tender. There are normal bowel sounds 

heard in all four quadrants and there is no organomegaly palpated.


Musculoskeletal: There is no back tenderness noted. Extremities are non-tender 

with full range of motion. There is good capillary refill. There is no 

peripheral edema or calf tenderness elicited.


Neurological: Patient is alert and oriented to person, place and time. The 

patient has symmetrical motor strength in all four extremities. Cranial nerves 

are grossly intact. Deep tendon reflexes are symmetrical and equal in all four 

extremities.


Psychiatric: The patient has an appropriate affect and does not exhibit any 

anxiety or depression.


Triage Information Reviewed: Yes


Vital Signs On Initial Exam: 


 Initial Vitals











Temp Pulse Resp BP Pulse Ox


 


 98.0 F   89   26   172/82   96 


 


 17 08:41  17 08:41  17 08:41  17 08:41  17 08:41











Vital Signs Reviewed: Yes





Diagnostics





- Vital Signs


 Vital Signs











  Temp Pulse Resp BP Pulse Ox


 


 17 08:41  98.0 F  89  26  172/82  96














- Laboratory


Lab Results: 


 Lab Results











  17 Range/Units





  09:04 09:04 09:04 


 


WBC   6.3   (3.5-10.8)  10^3/ul


 


RBC   4.03   (4.0-5.4)  10^6/ul


 


Hgb   12.2   (12.0-16.0)  g/dl


 


Hct   36   (35-47)  %


 


MCV   89   (80-97)  fL


 


MCH   30   (27-31)  pg


 


MCHC   34   (31-36)  g/dl


 


RDW   17 H   (10.5-15)  %


 


Plt Count   215   (150-450)  10^3/ul


 


MPV   9   (7.4-10.4)  um3


 


Neut % (Auto)   80.6   (38-83)  %


 


Lymph % (Auto)   12.4 L   (25-47)  %


 


Mono % (Auto)   5.3   (1-9)  %


 


Eos % (Auto)   1.1   (0-6)  %


 


Baso % (Auto)   0.6   (0-2)  %


 


Absolute Neuts (auto)   5.1   (1.5-7.7)  10^3/ul


 


Absolute Lymphs (auto)   0.8 L   (1.0-4.8)  10^3/ul


 


Absolute Monos (auto)   0.3   (0-0.8)  10^3/ul


 


Absolute Eos (auto)   0.1   (0-0.6)  10^3/ul


 


Absolute Basos (auto)   0   (0-0.2)  10^3/ul


 


Absolute Nucleated RBC   0.02   10^3/ul


 


Nucleated RBC %   0.3   


 


D-Dimer, Quantitative    < 200  (Less Than 230)  ng/mL


 


Sodium  136    (133-145)  mmol/L


 


Potassium  4.4    (3.5-5.0)  mmol/L


 


Chloride  104    (101-111)  mmol/L


 


Carbon Dioxide  24    (22-32)  mmol/L


 


Anion Gap  8    (2-11)  mmol/L


 


BUN  14    (6-24)  mg/dL


 


Creatinine  0.80    (0.51-0.95)  mg/dL


 


Est GFR ( Amer)  91.2    (>60)  


 


Est GFR (Non-Af Amer)  70.9    (>60)  


 


BUN/Creatinine Ratio  17.5    (8-20)  


 


Glucose  127 H    ()  mg/dL


 


Calcium  9.4    (8.6-10.3)  mg/dL


 


Total Bilirubin  0.50    (0.2-1.0)  mg/dL


 


AST  21    (13-39)  U/L


 


ALT  18    (7-52)  U/L


 


Alkaline Phosphatase  69    ()  U/L


 


Troponin I  0.01    (<0.04)  ng/mL


 


C-Reactive Protein  9.99 H    (< 5.00)  mg/L


 


B-Natriuretic Peptide    ( - 100) pg/mL


 


Total Protein  6.9    (6.4-8.9)  g/dL


 


Albumin  4.1    (3.2-5.2)  g/dL


 


Globulin  2.8    (2-4)  g/dL


 


Albumin/Globulin Ratio  1.5    (1-3)  














  17 Range/Units





  09:04 10:43 


 


WBC    (3.5-10.8)  10^3/ul


 


RBC    (4.0-5.4)  10^6/ul


 


Hgb    (12.0-16.0)  g/dl


 


Hct    (35-47)  %


 


MCV    (80-97)  fL


 


MCH    (27-31)  pg


 


MCHC    (31-36)  g/dl


 


RDW    (10.5-15)  %


 


Plt Count    (150-450)  10^3/ul


 


MPV    (7.4-10.4)  um3


 


Neut % (Auto)    (38-83)  %


 


Lymph % (Auto)    (25-47)  %


 


Mono % (Auto)    (1-9)  %


 


Eos % (Auto)    (0-6)  %


 


Baso % (Auto)    (0-2)  %


 


Absolute Neuts (auto)    (1.5-7.7)  10^3/ul


 


Absolute Lymphs (auto)    (1.0-4.8)  10^3/ul


 


Absolute Monos (auto)    (0-0.8)  10^3/ul


 


Absolute Eos (auto)    (0-0.6)  10^3/ul


 


Absolute Basos (auto)    (0-0.2)  10^3/ul


 


Absolute Nucleated RBC    10^3/ul


 


Nucleated RBC %    


 


D-Dimer, Quantitative    (Less Than 230)  ng/mL


 


Sodium    (133-145)  mmol/L


 


Potassium    (3.5-5.0)  mmol/L


 


Chloride    (101-111)  mmol/L


 


Carbon Dioxide    (22-32)  mmol/L


 


Anion Gap    (2-11)  mmol/L


 


BUN    (6-24)  mg/dL


 


Creatinine    (0.51-0.95)  mg/dL


 


Est GFR ( Amer)    (>60)  


 


Est GFR (Non-Af Amer)    (>60)  


 


BUN/Creatinine Ratio    (8-20)  


 


Glucose    ()  mg/dL


 


Calcium    (8.6-10.3)  mg/dL


 


Total Bilirubin    (0.2-1.0)  mg/dL


 


AST    (13-39)  U/L


 


ALT    (7-52)  U/L


 


Alkaline Phosphatase    ()  U/L


 


Troponin I   0.00  (<0.04)  ng/mL


 


C-Reactive Protein    (< 5.00)  mg/L


 


B-Natriuretic Peptide  48  ( - 100) pg/mL


 


Total Protein    (6.4-8.9)  g/dL


 


Albumin    (3.2-5.2)  g/dL


 


Globulin    (2-4)  g/dL


 


Albumin/Globulin Ratio    (1-3)  











Result Diagrams: 


 17 09:04





 17 09:04


Lab Statement: Any lab studies that have been ordered have been reviewed, and 

results considered in the medical decision making process.





- Radiology


  ** CXR


Xray Interpretation: No Acute Changes - Findings most consistent with 

congestive heart failure. ED physician has reviewed this report.


Radiology Interpretation Completed By: Radiologist





Course/Dx





- Course


Course Of Treatment: Ms. Stewart presented with discomfort in her left chest with 

a concern that she was recollecting her pleural effusion.  She was found to 

have a similar plain CXR to the one performed after her thoracentesis.  I don't 

think there is an emergency here but she should F/U as she probably will need a 

repeat tap at some point.


Assessment/Plan: The patient is diagnosed with chest pain. She is to follow up 

with her primary care provider as needed. Patient is agreeable with this plan.





- Diagnoses


Provider Diagnoses: 


 Chest pain, Pleural effusion








Discharge





- Discharge Plan


Condition: Stable


Disposition: HOME


Patient Education Materials:  Chest Pain (ED)


Referrals: 


Dieter Pearson MD [Primary Care Provider] - 


Additional Instructions: 


Follow up with your primary care provider, Dr. Pearson, as needed. Return to the 

Emergency Department if any new or worsening symptoms occur.





The documentation as recorded by the Roc logan Natalie accurately reflects 

the service I personally performed and the decisions made by me, Prashant Oneil MD.

## 2018-03-22 ENCOUNTER — HOSPITAL ENCOUNTER (OUTPATIENT)
Dept: HOSPITAL 25 - OR | Age: 73
Discharge: HOME | End: 2018-03-22
Attending: SURGERY
Payer: MEDICARE

## 2018-03-22 VITALS — DIASTOLIC BLOOD PRESSURE: 92 MMHG | SYSTOLIC BLOOD PRESSURE: 162 MMHG

## 2018-03-22 DIAGNOSIS — Z88.8: ICD-10-CM

## 2018-03-22 DIAGNOSIS — I10: ICD-10-CM

## 2018-03-22 DIAGNOSIS — Z91.013: ICD-10-CM

## 2018-03-22 DIAGNOSIS — C50.911: Primary | ICD-10-CM

## 2018-03-22 DIAGNOSIS — Z90.11: ICD-10-CM

## 2018-03-22 DIAGNOSIS — Z92.3: ICD-10-CM

## 2018-03-22 DIAGNOSIS — J91.0: ICD-10-CM

## 2018-03-22 DIAGNOSIS — E05.90: ICD-10-CM

## 2018-03-22 DIAGNOSIS — Z87.891: ICD-10-CM

## 2018-03-22 DIAGNOSIS — Z85.3: ICD-10-CM

## 2018-03-22 PROCEDURE — C1788 PORT, INDWELLING, IMP: HCPCS

## 2018-03-22 PROCEDURE — 76000 FLUOROSCOPY <1 HR PHYS/QHP: CPT

## 2018-03-22 RX ADMIN — FENTANYL CITRATE PRN MCG: 0.05 INJECTION, SOLUTION INTRAMUSCULAR; INTRAVENOUS at 12:53

## 2018-03-22 RX ADMIN — FENTANYL CITRATE PRN MCG: 0.05 INJECTION, SOLUTION INTRAMUSCULAR; INTRAVENOUS at 12:44

## 2018-03-22 NOTE — RAD
INDICATION: Power port placement



COMPARISONS: None relevant



TECHNIQUE: Fluoroscopy was provided for a vascular access procedure. Total fluoroscopy

time is: 9.6 seconds



FINDINGS: Spot images demonstrates a vascular catheter overlying the right hemipelvis. The

tip is not visible on the submitted images.



IMPRESSION: FLUOROSCOPY WAS PROVIDED FOR A VASCULAR ACCESS PROCEDURE



CPT II Codes: 6045F

## 2018-03-23 NOTE — OP
CC:  Dieter Pearson MD *

 

DATE OF OPERATION:  03/22/18 - SDS

 

DATE OF BIRTH:  [birth year]

 

SURGEON:  Mateusz Marti MD

 

ASSISTANT:  None.

 

ANESTHESIOLOGIST:  Dr. Boone.

 

ANESTHESIA:  General anesthetic, local infiltration.

 

PRE-OP DIAGNOSIS:  Breast cancer.

 

POST-OP DIAGNOSIS:  Breast cancer.

 

OPERATIVE PROCEDURE:  Placement of right transfemoral PowerPort.

 

DESCRIPTION OF PROCEDURE:  The patient was supine on the operative table.  
After adequate general anesthetic, compression stockings, Gilbert Hugger warmer, 
and intravenous antibiotics, the lower chest, abdomen, and right groin were 
prepped with antiseptic, draped in a sterile fashion.  Ultrasound was utilized 
to identify the femoral artery and vein.  Under direct vision, the femoral vein 
was cannulated with a needle and then guidewire passed under fluoroscopic 
guidance.  Catheter was passed through the peel-away introducer and was 
tunneled up along the right anterior axillary line to the right anterolateral 
chest wall.  A 3-cm incision was created in that location and the port was 
placed overlying the rib cage.  It was sutured in place using 2-0 Prolene.  The 
catheter was used.  Full length was attached to the port and the pocket was 
closed with 3-0 Vicryl and 5-0 Monocryl. There was contour incision utilized in 
the mid portion to help her with the tunneling.  The catheter was examined 
under fluoroscopy and was in good position looking like it was at approximately 
the junction of the iliac vein and the IVC. There was excellent blood return. 
It was flushed with saline solution and heparinized solution.  Steri-Strips 
were placed on all incision.  She tolerated the procedure well and was brought 
to Recovery in good condition.  There were no complications.  No drains.  No 
pathological specimens. Sponge and instrument counts correct.  Estimated blood 
loss less than 10 mL.

 

 [id number]/CPS #: [id number]

[facility]

## 2018-07-11 ENCOUNTER — HOSPITAL ENCOUNTER (EMERGENCY)
Dept: HOSPITAL 25 - ED | Age: 73
Discharge: HOME | End: 2018-07-11
Payer: MEDICARE

## 2018-07-11 VITALS — DIASTOLIC BLOOD PRESSURE: 82 MMHG | SYSTOLIC BLOOD PRESSURE: 159 MMHG

## 2018-07-11 DIAGNOSIS — Z88.3: ICD-10-CM

## 2018-07-11 DIAGNOSIS — Z86.718: ICD-10-CM

## 2018-07-11 DIAGNOSIS — Z87.01: ICD-10-CM

## 2018-07-11 DIAGNOSIS — I10: ICD-10-CM

## 2018-07-11 DIAGNOSIS — Z88.8: ICD-10-CM

## 2018-07-11 DIAGNOSIS — Z87.891: ICD-10-CM

## 2018-07-11 DIAGNOSIS — C79.9: ICD-10-CM

## 2018-07-11 DIAGNOSIS — Z90.11: ICD-10-CM

## 2018-07-11 DIAGNOSIS — Z79.01: ICD-10-CM

## 2018-07-11 DIAGNOSIS — Z85.3: ICD-10-CM

## 2018-07-11 DIAGNOSIS — R09.02: ICD-10-CM

## 2018-07-11 DIAGNOSIS — Z79.899: ICD-10-CM

## 2018-07-11 DIAGNOSIS — E07.9: ICD-10-CM

## 2018-07-11 DIAGNOSIS — J90: Primary | ICD-10-CM

## 2018-07-11 LAB
BASOPHILS # BLD AUTO: 0 10^3/UL (ref 0–0.2)
EOSINOPHIL # BLD AUTO: 0 10^3/UL (ref 0–0.6)
HCT VFR BLD AUTO: 35 % (ref 35–47)
HGB BLD-MCNC: 11.9 G/DL (ref 12–16)
INR PPP/BLD: 0.97 (ref 0.77–1.02)
LYMPHOCYTES # BLD AUTO: 0.4 10^3/UL (ref 1–4.8)
MCH RBC QN AUTO: 30 PG (ref 27–31)
MCHC RBC AUTO-ENTMCNC: 34 G/DL (ref 31–36)
MCV RBC AUTO: 88 FL (ref 80–97)
MONOCYTES # BLD AUTO: 0.2 10^3/UL (ref 0–0.8)
NEUTROPHILS # BLD AUTO: 1.7 10^3/UL (ref 1.5–7.7)
NRBC # BLD AUTO: 0 10^3/UL
NRBC BLD QL AUTO: 0.1
PLATELET # BLD AUTO: 215 10^3/UL (ref 150–450)
RBC # BLD AUTO: 4 10^6/UL (ref 4–5.4)
WBC # BLD AUTO: 2.3 10^3/UL (ref 3.5–10.8)

## 2018-07-11 PROCEDURE — 87040 BLOOD CULTURE FOR BACTERIA: CPT

## 2018-07-11 PROCEDURE — 99283 EMERGENCY DEPT VISIT LOW MDM: CPT

## 2018-07-11 PROCEDURE — 36415 COLL VENOUS BLD VENIPUNCTURE: CPT

## 2018-07-11 PROCEDURE — 84484 ASSAY OF TROPONIN QUANT: CPT

## 2018-07-11 PROCEDURE — 93005 ELECTROCARDIOGRAM TRACING: CPT

## 2018-07-11 PROCEDURE — 83880 ASSAY OF NATRIURETIC PEPTIDE: CPT

## 2018-07-11 PROCEDURE — 85025 COMPLETE CBC W/AUTO DIFF WBC: CPT

## 2018-07-11 PROCEDURE — 83605 ASSAY OF LACTIC ACID: CPT

## 2018-07-11 PROCEDURE — 85610 PROTHROMBIN TIME: CPT

## 2018-07-11 PROCEDURE — 85730 THROMBOPLASTIN TIME PARTIAL: CPT

## 2018-07-11 PROCEDURE — 80053 COMPREHEN METABOLIC PANEL: CPT

## 2018-07-11 PROCEDURE — 71046 X-RAY EXAM CHEST 2 VIEWS: CPT

## 2018-07-11 PROCEDURE — 96360 HYDRATION IV INFUSION INIT: CPT

## 2018-07-11 NOTE — RAD
HISTORY: SOB, on chemo (on lovenox)



COMPARISONS: March 15, 2018, PET/CT dated May 31, 2018



VIEWS: 4: Frontal dual-energy and lateral views of the chest.



FINDINGS:

CARDIOMEDIASTINAL SILHOUETTE: The cardiomediastinal silhouette is normal.

NATALIIA: The nataliia are normal.

PLEURA: There is moderate left and small right pleural effusion. There has been

progression on the left. The previous examination.

LUNG PARENCHYMA: There is hyperinflation. There is patchy alveolar opacification of the

lung bases bilaterally. There is persistent masslike opacification of the right lung apex

corresponding to an area of bronchiectasis and atelectasis on the previous CT examination

ABDOMEN: The upper abdomen is clear. There is no subphrenic gas.

BONES AND SOFT TISSUES: Degenerative changes are noted. Surgical clips are noted along the

chest.

OTHER: None.



IMPRESSION:

1.  HYPERINFLATION.

2.  MODERATE LEFT AND SMALL RIGHT PLEURAL EFFUSION, WITH PROGRESSION ON THE LEFT COMPARED

TO THE PREVIOUS EXAMINATION.

3.  BIBASILAR ATELECTASIS VERSUS CONSOLIDATION.

## 2018-07-11 NOTE — ED
Shortness of Breath





- HPI Summary


HPI Summary: 





This pt is a 74 y[facility] over [facility] 

few days. Pt has hx of breast CA with mets and is currently on chemotherapy. 

She states her "breathing has always been bad" but over the last few days SOB 

has worsened. Last week she notes her SOB worsened due to humidity.  Last night 

pt reports she had to sleep on a recliner. She has used a nebulizer with some 

relief. Her SOB is aggravated with ambulation. This morning pt notes she had a 

panic attack because she was unable to take a deep breath and states her O2 sat 

was 85% on room air. She also reports non productive cough. Pt denies pain with 

deep breathing. Denies chest pain, fever, diarrhea, nausea. 


Pt notes vomiting last night from chemotherapy. Today denies nausea.


Pt is currently on Lovenox. 


Her oncologist is Dr. Pearson.





- History of Current Complaint


Chief Complaint: EDShortnessOfBreath


Time Seen by Provider: 18 11:11


Hx Obtained From: Patient


Onset/Duration: Lasting Days, Still Present


Timing: Constant


Current Severity: Moderate


Dyspnea At: Rest


Aggrevating Factors: Other - ambulation


Alleviating Factors: Nothing


Associated Signs & Symptoms: Cough (Nonproductive)





- Allergy/Home Medications


Allergies/Adverse Reactions: 


 Allergies











Allergy/AdvReac Type Severity Reaction Status Date / Time


 


propoxyphene Allergy Intermediate Hives Verified 18 10:51


 


shellfish derived Allergy Intermediate Hives Verified 18 10:51


 


levofloxacin Allergy  Unknown Verified 18 10:51





   Reaction  





   Details  











Home Medications: 


 Home Medications





Acetaminophen TAB* [Tylenol TAB*] 650 mg PO Q4H PRN 18 [History Confirmed 

18]


Benzonatate CAP* [Tessalon 100 MG CAP*] 100 mg PO TID PRN 18 [History 

Confirmed 18]


Cholecalciferol TAB* [Vitamin D TAB*] 5,000 unit PO DAILY 18 [History 

Confirmed 18]


Dexamethasone TAB* [Decadron TAB*] 4 mg PO DAILY 18 [History Confirmed ]


Enoxaparin Sodium [Lovenox] 120 mg SC DAILY 18 [History Confirmed 18

]


Eribulin* [Halaven*] 1 mg .SEE ORDER WEEKLY 18 [History Confirmed 18

]


Levothyroxine TAB* [Synthroid TAB*] 100 mcg PO DAILY 18 [History 

Confirmed 18]


Loperamide CAP* [Imodium CAP*] 2 mg PO Q4H PRN 18 [History Confirmed ]


Metoprolol Succinate XL TAB* [Toprol XL TAB*] 25 mg PO DAILY 18 [History 

Confirmed 18]


Ondansetron ODT TAB* [Zofran 4 MG Odt TAB*] 4 mg PO Q6H PRN 18 [History 

Confirmed 18]


Potassium Chlor TAB* [Klor Con ER TAB*] 20 meq PO DAILY 18 [History 

Confirmed 18]


Prochlorperazine TAB* [Compazine Tab*] 10 mg PO Q6H PRN 18 [History 

Confirmed 18]


Zoledronic Acid* [Zometa] 4 mg .SEE ORDER WEEKLY 18 [History Confirmed ]


guaiFENesin LIQ* [Robitussin*] 10 ml PO Q4H PRN 18 [History Confirmed ]











PMH/Surg Hx/FS Hx/Imm Hx


Endocrine/Hematology History: Reports: Hx Thyroid Disease


   Denies: Hx Diabetes


Cardiovascular History: Reports: Hx Hypertension, Other Cardiovascular Problems/

Disorders - hx blood clots from neck down left arm from port


Respiratory History: Reports: Hx Pleural Effusion, Hx Pneumonia - several times 

after chemo, Other Respiratory Problems/Disorders - BREAST CA. PLEURAL EFFUSION


GI History: Reports: Hx Gastroesophageal Reflux Disease


   Denies: Other GI Disorders


 History: 


   Denies: Hx Renal Disease


Musculoskeletal History: 


   Denies: Other Musculoskeletal History


Sensory History: Reports: Hx Cataracts - left, Hx Contacts or Glasses - reading


   Denies: Hx Hearing Aid


Opthamlomology History: Reports: Hx Cataracts - left, Hx Contacts or Glasses - 

reading


Psychiatric History: 


   Comment Only: Other Psychiatric Issues/Disorders - clostrophobic





- Cancer History


Cancer Type, Location and Year: Right Breast, , Dr. Cavanaugh


Hx Chemotherapy: Yes


Hx Radiation Therapy: Yes


Hx Palliative Cancer Treatment: No





- Surgical History


Surgery Procedure, Year, and Place: rt mastectomy ; 


Hx Anesthesia Reactions: No


Infectious Disease History: No


Infectious Disease History: 


   Denies: History Other Infectious Disease, Traveled Outside the [place] in Last 30 

Days





- Family History


Known Family History: 


   Negative: Cardiac Disease, Hypertension, Diabetes





- Social History


Alcohol Use: None


Substance Use Type: Reports: None


Smoking Status (MU): Former Smoker


Amount Used/How Often: pack a week for 18 yrs


Have You Smoked in the Last Year: No





Review of Systems


Negative: Fever, Chills


Negative: Chest Pain


Positive: Shortness Of Breath, Cough


Negative: Vomiting, Diarrhea, Nausea


All Other Systems Reviewed And Are Negative: Yes





Physical Exam





- Summary


Physical Exam Summary: 





Appearance: Well appearing, no pain distress


Skin: warm, dry, reflects adequate perfusion


Head/face: normal


Eyes: EOMI, ARIANE


ENT: Moist mucous membranes. 


Neck: supple, non-tender. No JVD. 


Chest: Mastectomy on the R, scarring on left chest. 


Respiratory: diminished in the left base, no wheezes, dry cough, otherwise 

clear. Pt is tachypneic. 


Cardiovascular: Tachycardic, pulses symmetrical 


Abdomen: non-tender, soft


Bowel: present


Musculoskeletal: normal, strength/ROM intact. No edema. 


Neuro: normal, sensory motor intact, A&Ox3


Triage Information Reviewed: Yes


Vital Signs On Initial Exam: 


 Initial Vitals











Temp Pulse Resp BP Pulse Ox


 


 97.2 F   110   20   160/86   91 


 


 18 10:47  18 10:47  18 10:47  18 10:47  18 10:47











Vital Signs Reviewed: Yes





Diagnostics





- Vital Signs


 Vital Signs











  Temp Pulse Resp BP Pulse Ox


 


 18 10:47  97.2 F  110  20  160/86  91














- Laboratory


Result Diagrams: 


 18 12:15





 18 12:15


Lab Statement: Any lab studies that have been ordered have been reviewed, and 

results considered in the medical decision making process.





- Radiology


  ** Chest XR


Xray Interpretation: Positive (See Comments) - IMPRESSION: 1. Hyperinflation. 

2. Moderate left and small right pleural effusion, with progression on the left 

compared to the previous examination. 3. Bibasilar atelectasis versus 

consolidation. Dr. Harp has reviewed this report.


Radiology Interpretation Completed By: ED Physician - Left sided pleural 

effusion, Radiologist





- EKG


  ** 11:25


Cardiac Rate: NL - at 98 bpm


EKG Rhythm: Sinus Rhythm


ST Segment: Normal


EKG Interpretation: Q waves inferiorly. Possible lead reversal in leads II and 

III. 





Re-Evaluation





- Re-Evaluation


  ** First Eval


Re-Evaluation Time: 13:04


Change: Unchanged


Comment: Pt was ambulated in the ED. Resting O2 sat is 95% on room air and with 

ambulation O2 sat decreased to 88% with heart rate of 122 bpm.





  ** Second Eval


Re-Evaluation Time: 13:14


Change: Improved


Comment: Pt was ambulated in the ED with 2L of oxygen and did better saturating 

between 94-95% with a heart rate of 110.





Course/Dx





- Course


Course Of Treatment: Patient presents with history of metastatic breast CA and 

pleural effusion with dyspnea.  She is on full dose Lovenox for therapeutic 

anticoagulation.  She has no evidence for DVT.  There is growing left-sided 

pleural effusion and small right-sided pleural effusion.  She was ambulated 

here without oxygen and was quite dyspneic and hypoxic.  I spoke with her 

oncologist who agreed that home oxygen was likely the best option.  We walked 

her with oxygen and found that her symptoms were greatly abated.  Home oxygen 

was arranged through case management and placed prior to discharge.  She'll 

follow closely with her oncologist.





- Diagnoses


Differential Diagnosis/HQI/PQRI: Positive: Other - DVT/PE, pneumonia, pleural 

effusion


Provider Diagnoses: 


 Pleural effusion, Hypoxia, Metastatic breast cancer








- Physician Notifications


Discussed Care of Patient With: Aakash Chavira


Time Discussed With Above Provider: 13:07


Instructed by Provider To: Other - I discussed pt care with Dr. Chavira, 

oncologist, who recommends to walk pt on oxygen and see how she does.





Discharge





- Sign-Out/Discharge


Documenting (check all that apply): Patient Departure - Discharge





- Discharge Plan


Condition: Improved


Disposition: HOME


Patient Education Materials:  Pleural Effusion (ED), Hypoxia (ED)


Referrals: 


Dieter Pearson MD [Primary Care Provider] - 


Additional Instructions: 


Please follow up with Dr. Pearson tomorrow.








RETURN TO THE ED FOR ANY WORSENING OR NEW SYMPTOMS, SUCH AS FEVER, CHEST PAIN, 

SOB. 





- Billing Disposition and Condition


Condition: IMPROVED


Disposition: Home

## 2018-07-16 ENCOUNTER — HOSPITAL ENCOUNTER (OUTPATIENT)
Dept: HOSPITAL 25 - OR | Age: 73
Discharge: HOME | End: 2018-07-16
Attending: SURGERY
Payer: MEDICARE

## 2018-07-16 DIAGNOSIS — J91.0: Primary | ICD-10-CM

## 2018-07-16 DIAGNOSIS — C50.919: ICD-10-CM

## 2018-07-16 PROCEDURE — 71045 X-RAY EXAM CHEST 1 VIEW: CPT

## 2018-07-16 PROCEDURE — 32554 ASPIRATE PLEURA W/O IMAGING: CPT

## 2018-07-16 NOTE — RAD
Indication: Post LEFT thoracentesis.



Comparison: July 13, 2018 CT and July 11, 2018 chest radiograph.



Technique: Upright AP 1220 hours



Report: Negative for pneumothorax. Interval decrease in LEFT pleural effusion. Current

exam demonstrates small bilateral dependent pleural effusions. LEFT greater than RIGHT

lower lung zone atelectasis. 1.2 cm nodule at the medial RIGHT upper lung zone corresponds

with extensive calcification at the RIGHT first costochondral junction on CT. Magnitude of

LEFT lower lung zone atelectasis precludes accurate assessment of heart size. Unremarkable

central pulmonary vasculature and mediastinal contours. 



IMPRESSION: 

#. Negative for pneumothorax post thoracentesis. 

#. Interval decrease in volume of LEFT pleural fluid.

## 2018-07-17 NOTE — OP
CC:  Dieter Pearson MD *

 

DATE OF OPERATION:  07/16/18 - [facility]

 

DATE OF BIRTH:  [birth year]

 

SURGEON:  Jacinto Nelson MD

 

ASSISTANT:  None.

 

ANESTHESIA:  Local anesthesia.

 

PRE-OP DIAGNOSES:

1.  Metastatic breast cancer.

2.  Left pleural effusion.

 

POST-OP DIAGNOSES:

1.  Metastatic breast cancer.

2.  Left pleural effusion.

 

OPERATIVE PROCEDURE:  Left thoracentesis.

 

INDICATION:  Ms. Stewart is a 73-year-old female whose case I discussed with my 
partner last week.  CT chest was reviewed.  The patient had notable shortness 
of breath, had undergone thoracentesis in the past, most recently on the left 
with fair to good results.  She had a recurrence of pleural effusion right side 
as well as left with left greater than right.  I outlined the details of the 
procedure of a left thoracentesis to her.  I went over the risks, benefits and 
alternatives.  We spoke of watchful waiting.  We also spoke of possibility of 
PleurX catheter to help with this placement at a later date.  The patient's 
shortness of breath suggested that she wanted some intervention and she signed 
consent willingly.  I spoke of the possible complications which included but 
not limited to hemothorax, pneumothorax, need for additional procedures, 
inability to evacuate enough fluid for any symptom relief.  The patient was 
then marked.  Time out was performed as she signed consent.

 

FINDINGS:  1 L of straw colored fluid that was removed from the left chest.

 

SPECIMEN:  No specimen was sent.

 

DESCRIPTION OF PROCEDURE:  The left chest was approached at landmark at the 9th 
intercostal space.  Incision was made after injection of lidocaine.  An 8-
Japanese catheter was then inserted over a needle in to the left chest and 
approximately a liter of straw colored fluid was evacuated.  The patient had 
some significant discomfort as well as coughing.  The tube output started 
producing more blood-like material.  This did clot within the tubing.  I was 
able to free the clot and then get back to thinner fluid.  This never flowed 
very well and the patient was uncomfortable and, so we stopped the intervention 
after just over 1 L of fluid output.  Catheter was removed and a bandage placed.

 

A chest x-ray was performed postoperatively and this was reviewed.  The patient 
is to follow up with her oncologist.

 

I spoke to her oncologist and discussed the possibility of placement of a 
PleurX catheter in the future.

 

 [id number]/[facility] #: [id number]

ASHLEY

## 2018-07-18 ENCOUNTER — HOSPITAL ENCOUNTER (INPATIENT)
Dept: HOSPITAL 25 - ED | Age: 73
LOS: 2 days | Discharge: HOSPICE | DRG: 598 | End: 2018-07-20
Attending: INTERNAL MEDICINE | Admitting: INTERNAL MEDICINE
Payer: MEDICARE

## 2018-07-18 DIAGNOSIS — J45.909: ICD-10-CM

## 2018-07-18 DIAGNOSIS — Z79.1: ICD-10-CM

## 2018-07-18 DIAGNOSIS — J91.0: ICD-10-CM

## 2018-07-18 DIAGNOSIS — Z88.5: ICD-10-CM

## 2018-07-18 DIAGNOSIS — Z91.013: ICD-10-CM

## 2018-07-18 DIAGNOSIS — Z79.899: ICD-10-CM

## 2018-07-18 DIAGNOSIS — Z17.0: ICD-10-CM

## 2018-07-18 DIAGNOSIS — E05.90: ICD-10-CM

## 2018-07-18 DIAGNOSIS — Z87.891: ICD-10-CM

## 2018-07-18 DIAGNOSIS — C50.919: Primary | ICD-10-CM

## 2018-07-18 DIAGNOSIS — I10: ICD-10-CM

## 2018-07-18 DIAGNOSIS — Z80.8: ICD-10-CM

## 2018-07-18 DIAGNOSIS — Z80.42: ICD-10-CM

## 2018-07-18 DIAGNOSIS — Z66: ICD-10-CM

## 2018-07-18 LAB
BASOPHILS # BLD AUTO: 0 10^3/UL (ref 0–0.2)
EOSINOPHIL # BLD AUTO: 0 10^3/UL (ref 0–0.6)
HCT VFR BLD AUTO: 38 % (ref 35–47)
HGB BLD-MCNC: 12.8 G/DL (ref 12–16)
INR PPP/BLD: 1.02 (ref 0.77–1.02)
LYMPHOCYTES # BLD AUTO: 0.4 10^3/UL (ref 1–4.8)
MCH RBC QN AUTO: 30 PG (ref 27–31)
MCHC RBC AUTO-ENTMCNC: 34 G/DL (ref 31–36)
MCV RBC AUTO: 89 FL (ref 80–97)
MONOCYTES # BLD AUTO: 0.9 10^3/UL (ref 0–0.8)
NEUTROPHILS # BLD AUTO: 3.5 10^3/UL (ref 1.5–7.7)
NRBC # BLD AUTO: 0 10^3/UL
NRBC BLD QL AUTO: 0
PLATELET # BLD AUTO: 247 10^3/UL (ref 150–450)
RBC # BLD AUTO: 4.31 10^6/UL (ref 4–5.4)
WBC # BLD AUTO: 4.8 10^3/UL (ref 3.5–10.8)

## 2018-07-18 PROCEDURE — 36415 COLL VENOUS BLD VENIPUNCTURE: CPT

## 2018-07-18 PROCEDURE — 87086 URINE CULTURE/COLONY COUNT: CPT

## 2018-07-18 PROCEDURE — 81003 URINALYSIS AUTO W/O SCOPE: CPT

## 2018-07-18 PROCEDURE — 99284 EMERGENCY DEPT VISIT MOD MDM: CPT

## 2018-07-18 PROCEDURE — 87040 BLOOD CULTURE FOR BACTERIA: CPT

## 2018-07-18 PROCEDURE — 71045 X-RAY EXAM CHEST 1 VIEW: CPT

## 2018-07-18 PROCEDURE — 85730 THROMBOPLASTIN TIME PARTIAL: CPT

## 2018-07-18 PROCEDURE — 32554 ASPIRATE PLEURA W/O IMAGING: CPT

## 2018-07-18 PROCEDURE — 87186 SC STD MICRODIL/AGAR DIL: CPT

## 2018-07-18 PROCEDURE — 93005 ELECTROCARDIOGRAM TRACING: CPT

## 2018-07-18 PROCEDURE — 87077 CULTURE AEROBIC IDENTIFY: CPT

## 2018-07-18 PROCEDURE — 71046 X-RAY EXAM CHEST 2 VIEWS: CPT

## 2018-07-18 PROCEDURE — 85610 PROTHROMBIN TIME: CPT

## 2018-07-18 PROCEDURE — 83880 ASSAY OF NATRIURETIC PEPTIDE: CPT

## 2018-07-18 PROCEDURE — 86300 IMMUNOASSAY TUMOR CA 15-3: CPT

## 2018-07-18 PROCEDURE — 81015 MICROSCOPIC EXAM OF URINE: CPT

## 2018-07-18 PROCEDURE — 99223 1ST HOSP IP/OBS HIGH 75: CPT

## 2018-07-18 PROCEDURE — 83921 ORGANIC ACID SINGLE QUANT: CPT

## 2018-07-18 PROCEDURE — 74177 CT ABD & PELVIS W/CONTRAST: CPT

## 2018-07-18 PROCEDURE — 83605 ASSAY OF LACTIC ACID: CPT

## 2018-07-18 PROCEDURE — 85025 COMPLETE CBC W/AUTO DIFF WBC: CPT

## 2018-07-18 PROCEDURE — 84484 ASSAY OF TROPONIN QUANT: CPT

## 2018-07-18 PROCEDURE — 80053 COMPREHEN METABOLIC PANEL: CPT

## 2018-07-18 NOTE — PN
Progress Note





- Progress Note


Date of Service: 07/18/18


SOAP: 


Follow up to admission. Discussed progressive disease on CT scans and limited 

options for therapy.  Additional chemotherapy likely to further degrade QOL. 

Will focus on maximizing breathing and comfort. 


- Pleurex catheter


- Palliative care consult and discharge to hospice


- Increase Morphine to 5 mg q2 hr prn


- Currently DNR


[]

## 2018-07-18 NOTE — RAD
HISTORY: SOB



COMPARISONS: July 16, 2018



VIEWS: 4: Frontal dual-energy and lateral views of the chest.



FINDINGS:

CARDIOMEDIASTINAL SILHOUETTE: The cardiomediastinal silhouette is normal.

NATALIIA: The nataliia are normal.

PLEURA: There is moderate left pleural effusion and a small right pleural effusion.

LUNG PARENCHYMA: There is hyperinflation with flattening of the diaphragm and expansion of

the AP diameter of the chest. There is prominence of the central pulmonary vasculature.

ABDOMEN: The upper abdomen is clear. There is no subphrenic gas.

BONES AND SOFT TISSUES: No bone or soft tissue abnormalities are noted.

OTHER: None.



IMPRESSION:

1.  LEFT GREATER THAN OR RIGHT PLEURAL EFFUSIONS.

2.  PULMONARY VASCULAR CONGESTION.

3.  HYPERINFLATION

## 2018-07-18 NOTE — ED
Shortness of Breath





- HPI Summary


HPI Summary: 





This is Matilda logan, documenting for attending Edison Medina MD.





This patient is a 73 year old F presenting to [facility], from [facility] area, 

accompanied by her  with a chief complaint of SOB, described as being 

suffocated for the past week. She started 3L of NC at home O2 this week. SOB 

worsened by laying supine. She reports a similar symptoms previously relieved 

by thoracentesis. Patient has been taking Lovenox for a year due to a history 

of blood clots.  She is currently being treated for breast CA with mets to the 

bone liver and lungs, with chemotherapy. Dr. Pearson is oncologist. Denies chest 

pain, fever, chills, abdominal pain, vomiting, and nausea.








- History of Current Complaint


Chief Complaint: EDShortnessOfBreath


Time Seen by Provider: 18 06:55


Hx Obtained From: Patient


Onset/Duration: Lasting Weeks


Timing: Constant


Dyspnea At: Rest


Aggrevating Factors: Recumbent Position


Alleviating Factors: Other - previously thoracentesis


Associated Signs & Symptoms: Negative


Related History: Similar Episode





- Allergy/Home Medications


Allergies/Adverse Reactions: 


 Allergies











Allergy/AdvReac Type Severity Reaction Status Date / Time


 


propoxyphene Allergy Intermediate Hives Verified 18 09:50


 


shellfish derived Allergy Intermediate Hives Verified 18 09:50


 


levofloxacin Allergy  Unknown Verified 18 09:50





   Reaction  





   Details  














PMH/Surg Hx/FS Hx/Imm Hx


Endocrine/Hematology History: Reports: Hx Thyroid Disease


   Denies: Hx Diabetes


Cardiovascular History: Reports: Hx Hypertension, Other Cardiovascular Problems/

Disorders - hx blood clots from neck down left arm from port


   Denies: Hx Congestive Heart Failure


Respiratory History: Reports: Hx Pleural Effusion, Hx Pneumonia - several times 

after chemo, Other Respiratory Problems/Disorders - BREAST CA. PLEURAL EFFUSION


GI History: Reports: Hx Gastroesophageal Reflux Disease


   Denies: Other GI Disorders


 History: 


   Denies: Hx Renal Disease


Musculoskeletal History: 


   Denies: Other Musculoskeletal History


Sensory History: Reports: Hx Cataracts - left, Hx Contacts or Glasses - reading


   Denies: Hx Hearing Aid


Opthamlomology History: Reports: Hx Cataracts - left, Hx Contacts or Glasses - 

reading


Psychiatric History: 


   Comment Only: Other Psychiatric Issues/Disorders - clostrophobic





- Cancer History


Cancer Type, Location and Year: Right Breast, , Dr. Mao Bajwa Chemotherapy: Yes


Hx Radiation Therapy: Yes


Hx Palliative Cancer Treatment: No





- Surgical History


Surgery Procedure, Year, and Place: rt mastectomy ; 


Hx Anesthesia Reactions: No


Infectious Disease History: No


Infectious Disease History: 


   Denies: History Other Infectious Disease, Traveled Outside the [place] in Last 30 

Days





- Family History


Known Family History: 


   Negative: Cardiac Disease, Hypertension, Diabetes





- Social History


Occupation: Retired


Alcohol Use: None


Substance Use Type: Reports: None


Smoking Status (MU): Former Smoker


Amount Used/How Often: pack a week for 18 yrs


Have You Smoked in the Last Year: No





Review of Systems


Constitutional: Negative


Positive: Shortness Of Breath


Gastrointestinal: Negative


All Other Systems Reviewed And Are Negative: Yes





Physical Exam





- Summary


Physical Exam Summary: 





GENERAL:  Patient is a well developed and nourished female who is lying 

comfortable in the stretcher.  Patient is not in any acute respiratory distress.


HEAD AND FACE: Normocephalic


EYES: PERRLA, EOMI x 2.


EARS: Hearing grossly intact.


MOUTH: Oropharynx within normal limits.


NECK: Supple, trachea is midline, no adenopathy, no JVD, no carotid bruit.


CHEST: Symmetric, no tenderness at palpation


LUNGS: Clear to auscultation bilaterally. No wheezing or crackles. Decreased 

breath sounds at the bases


CVS: Regular rate and rhythm, S1 and S2 present, no murmurs or gallops 

appreciated.


ABDOMEN: Soft, non-tender. Bowel sounds are normal. No abdominal abnormal 

pulsations.


EXTREMITIES: Full ROM in all major joints, no edema, no cyanosis or clubbing.


NEURO: Alert and oriented x 3. No acute neurological deficits. Speech is normal 

and follows commands.


SKIN: Dry and warm





Triage Information Reviewed: Yes


Vital Signs On Initial Exam: 


 Initial Vitals











Temp Pulse Resp BP Pulse Ox


 


 98.3 F   100   18   146/98   98 


 


 18 06:49  18 06:49  18 06:49  18 06:49  18 06:49











Vital Signs Reviewed: Yes





Diagnostics





- Vital Signs


 Vital Signs











  Temp Pulse Resp BP Pulse Ox


 


 18 06:49  98.3 F  100  18  146/98  98














- Laboratory


Result Diagrams: 


 18 06:37





 18 06:38


Lab Statement: Any lab studies that have been ordered have been reviewed, and 

results considered in the medical decision making process.





- Radiology


  ** CXR


Radiology Interpretation Completed By: Radiologist - 1.  LEFT GREATER THAN OR 

RIGHT PLEURAL EFFUSIONS. 2.  PULMONARY VASCULAR CONGESTION. 3.  HYPERINFLATION 

ED Physician has reviewed this report.





- EKG


  ** 0714


Cardiac Rate: NL - 97 BPM


EKG Rhythm: Sinus Rhythm


EKG Interpretation: left atrial enlargement, Q wave in inferior and anterior 

leads, similar to 


EKG Comparison: No Significant Change





Course/Dx





- Course


Course Of Treatment: 73 year old F presenting with a chief complaint of SOB, 

for the past week. She started 3L of NC at home O2 this week. SOB worsened by 

laying supine. She reports a similar symptoms previously relieved by 

thoracentesis. Patient has been taking Lovenox for a year due to a history of 

blood clots.  She is currently being treated for breast CA with mets to the 

bone liver and lungs, with chemotherapy. Dr. Pearson is oncologist. Denies chest 

pain, fever, chills, abdominal pain, vomiting, and nausea.Workup remarkable for 

pleural effusion bilaterally worse on the left than right, revealed in CXR. EKG 

reveals left atrial enlargement and Q wave in inferior and anterior leads, 

similar to previous EKGs. Pt will be admitted to oncology team. Pt accepted by 

Dr. Chavira at 09:51





- Diagnoses


Provider Diagnoses: 


 Pleural effusion








- Physician Notifications


Discussed Care of Patient With: Aakash Chavira - oncologist


Time Discussed With Above Provider: 09:51


Instructed by Provider To: Admit As Inpatient





Discharge





- Sign-Out/Discharge


Documenting (check all that apply): Patient Departure





- Discharge Plan


Condition: Stable


Disposition: ADMITTED TO [facility]





- Billing Disposition and Condition


Condition: STABLE


Disposition: Admitted to [facility]

## 2018-07-19 LAB
BASOPHILS # BLD AUTO: 0.1 10^3/UL (ref 0–0.2)
EOSINOPHIL # BLD AUTO: 0 10^3/UL (ref 0–0.6)
HCT VFR BLD AUTO: 38 % (ref 35–47)
HGB BLD-MCNC: 12.8 G/DL (ref 12–16)
LYMPHOCYTES # BLD AUTO: 0.6 10^3/UL (ref 1–4.8)
MCH RBC QN AUTO: 30 PG (ref 27–31)
MCHC RBC AUTO-ENTMCNC: 34 G/DL (ref 31–36)
MCV RBC AUTO: 88 FL (ref 80–97)
MONOCYTES # BLD AUTO: 0.8 10^3/UL (ref 0–0.8)
NEUTROPHILS # BLD AUTO: 3 10^3/UL (ref 1.5–7.7)
NRBC # BLD AUTO: 0 10^3/UL
NRBC BLD QL AUTO: 0.1
PLATELET # BLD AUTO: 267 10^3/UL (ref 150–450)
RBC # BLD AUTO: 4.29 10^6/UL (ref 4–5.4)
RBC UR QL AUTO: (no result)
WBC # BLD AUTO: 4.5 10^3/UL (ref 3.5–10.8)
WBC UR QL AUTO: (no result)

## 2018-07-19 PROCEDURE — 0WHB33Z INSERTION OF INFUSION DEVICE INTO LEFT PLEURAL CAVITY, PERCUTANEOUS APPROACH: ICD-10-PCS | Performed by: SURGERY

## 2018-07-19 RX ADMIN — METOPROLOL SUCCINATE SCH MG: 25 TABLET, EXTENDED RELEASE ORAL at 09:49

## 2018-07-19 RX ADMIN — LEVOTHYROXINE SODIUM SCH MCG: 100 TABLET ORAL at 05:31

## 2018-07-19 RX ADMIN — HEPARIN SCH ML: 100 SYRINGE at 11:50

## 2018-07-19 NOTE — PN
Progress Note





- Progress Note


Date of Service: 07/19/18


SOAP: 


Subjective:


tearful and anxious.  feels anxious to get pleurex cath in and wants to move 

forward so that she can go home tomorrow on hospice.  tearful appropriately 

about dying.  feels that she has a very good support system.








Objective:


 Vital Signs











Temp Pulse Resp BP Pulse Ox


 


 97.5 F   94   22   141/66   98 


 


 07/19/18 07:45  07/19/18 07:45  07/19/18 08:00  07/19/18 07:45  07/19/18 07:45








sitting up in chair on O2, tachypneic


op dry


dec bs bilaterally 


soft nt +abd wall port


1+ LE edema


A+O x 3, nonfocal neurological exam











Acetaminophen (Tylenol Tab*)  650 mg PO Q4H PRN


   PRN Reason: pain/headache/fever


Heparin Sodium (Porcine) (Heparin Flush Port (Ivad)**)  5 ml FLUSH DAILY [facility]; 

Protocol


Levothyroxine Sodium (Synthroid Tab*)  100 mcg PO DAILY@0600 [facility]


   Last Admin: 07/19/18 05:31 Dose:  100 mcg


Loperamide HCl (Imodium Cap*)  2 mg PO Q4H PRN


   PRN Reason: LOOSE STOOLS


Lorazepam (Ativan Inj*)  0.5 mg IV PUSH Q4H PRN


   PRN Reason: ANXIETY


   Last Admin: 07/18/18 13:26 Dose:  0.5 mg


Metoprolol Succinate (Toprol Xl Tab*)  25 mg PO DAILY [facility]


   Last Admin: 07/19/18 09:49 Dose:  25 mg


Morphine Sulfate (Morphine Inj (Syringe)*)  5 mg IV Q2H PRN


   PRN Reason: Pain/sob


Ondansetron HCl (Zofran Odt Tab*)  4 mg PO Q6H PRN


   PRN Reason: NAUSEA








Assessment:


72 yo F w metastatic breast cancer with recurrent pleural effusion, now with 

plan for palliative pleurex catheter and then discharge to home hospice.








Plan:


-hospice referral placed today urgently


-pleurex later today


-anticipate dc home tomorrow

## 2018-07-19 NOTE — RAD
INDICATION: Breast carcinoma



COMPARISON: Chest x-ray July 18, 2018

 

TECHNIQUE: An AP portable view obtained at 1639 hours is submitted.



FINDINGS: 



Bones/Soft Tissues: There are no acute bony findings. There are postoperative changes in

right chest



Cardiomediastinal: The cardiac silhouette is unchanged. There is uncoiling of the aorta.

There is mild interstitial prominence. There may be a mild component of interstitial

congestion



Lungs: There is mild bibasilar infiltrate or atelectasis . There is no pneumothorax.



Pleura: There are pleural effusions left greater than right. Left-sided effusion is

smaller. 



Other: None



IMPRESSION:  SPECT INTERSTITIAL CONGESTION WITH BASILAR COMPRESSION ATELECTASIS and

bilateral pleural effusions. The left-sided effusion is smaller

## 2018-07-20 VITALS — SYSTOLIC BLOOD PRESSURE: 138 MMHG | DIASTOLIC BLOOD PRESSURE: 67 MMHG

## 2018-07-20 RX ADMIN — HEPARIN SCH ML: 100 SYRINGE at 09:21

## 2018-07-20 RX ADMIN — LEVOTHYROXINE SODIUM SCH MCG: 100 TABLET ORAL at 05:50

## 2018-07-20 RX ADMIN — METOPROLOL SUCCINATE SCH MG: 25 TABLET, EXTENDED RELEASE ORAL at 09:19

## 2018-07-20 NOTE — OP
CC:  Dr. Pearson *

 

DATE OF OPERATION:  07/19/18 - ROOM #407

 

DATE OF BIRTH:  [birth year]

 

SURGEON:  Mateusz Marti MD

 

ASSISTANT:  None.

 

ANESTHESIOLOGIST:  Roger.

 

ANESTHESIA:  LMAC anesthesia.

 

PRE-OP DIAGNOSIS:  Recurrent left pleural effusion.

 

POST-OP DIAGNOSIS:  Recurrent left pleural effusion.

 

OPERATIVE PROCEDURE:  Placement of left PleurX catheter.

 

DESCRIPTION OF PROCEDURE:  The patient was supine on the operative table.  She 
was inclined towards her right side.  The left chest  was prepped with 
antiseptic, draped in a sterile fashion.  Time-out confirmed the laterality.  
Local anesthetic was administered in the posterior axillary line and a skinny 
needle was used to identify the pleural space.  Pleural fluid was readily 
forthcoming.  Then, the large needle was utilized and the guide wire was passed 
without difficulty. The catheter was tunneled from an anterior aspect and put 
in through the Peel-Away introducer and the entrance site was closed with 3-0 
Vicryl, which was also used at the tube site and the chest was drained of a 
total of 800 mL of clear fluid.  She tolerated this well.  Sterile bandage was 
placed and she was brought to Recovery in good condition.

 

 [id number]/[facility] #: [id number]

MTDD

## 2018-07-20 NOTE — PN
Progress Note





- Progress Note


Date of Service: 07/20/18


SOAP: 


Subjective:


[]Better today. Tolerated pleurex, no pain. Breathing fine sitting still, any 

movement and SOB. Not in pain. 











Acetaminophen (Tylenol Tab*)  650 mg PO Q4H PRN


   PRN Reason: pain/headache/fever


   Last Admin: 07/19/18 19:27 Dose:  650 mg


Heparin Sodium (Porcine) (Heparin Flush Port (Ivad)**)  5 ml FLUSH DAILY [facility]; 

Protocol


   Last Admin: 07/20/18 09:21 Dose:  5 ml


Lactated Ringer's (Lactated Ringers 1000 Ml Bag*)  1,000 mls @ 125 mls/hr IV 

PER RATE [facility]


Levothyroxine Sodium (Synthroid Tab*)  100 mcg PO DAILY@0600 [facility]


   Last Admin: 07/20/18 05:50 Dose:  100 mcg


Loperamide HCl (Imodium Cap*)  2 mg PO Q4H PRN


   PRN Reason: LOOSE STOOLS


Lorazepam (Ativan Inj*)  0.5 mg IV PUSH Q4H PRN


   PRN Reason: ANXIETY


   Last Admin: 07/18/18 13:26 Dose:  0.5 mg


Metoprolol Succinate (Toprol Xl Tab*)  25 mg PO DAILY [facility]


   Last Admin: 07/20/18 09:19 Dose:  25 mg


Morphine Sulfate (Morphine Inj (Syringe)*)  5 mg IV Q2H PRN


   PRN Reason: Pain/sob


Ondansetron HCl (Zofran Odt Tab*)  4 mg PO Q6H PRN


   PRN Reason: NAUSEA








Objective:


[]


 Vital Signs











Temp Pulse Resp BP Pulse Ox


 


 97.5 F   90   18   138/67   100 


 


 07/20/18 07:14  07/20/18 07:14  07/20/18 08:00  07/20/18 07:14  07/20/18 07:14








HEENT: Pale, pimples pin point


CTA


RRR s1S2, I did not hear the murmur


+BS NT


Tr edema








Assessment:


[]73 year old with end stage breast cancer and progressive SOB. Had Pleurex 

yesterday and will be discharged today with home hospice likely starting next 

week. She is not in pain, breathing better but still a challenge after catheter 

placed. 








Plan:


[]1. Will discharge home today


2. Plan teaching Pleurex drain before discharge. Our office is working on 

having bulbs sent.


3. Discussed hospice again, call me over weekend for any difficulty.


4. Morphine 10-20 mg q 4 for SOB


time spent on discharge 40 min today

## 2022-06-29 NOTE — RAD
CLINICAL HISTORY: Metastatic breast cancer 



COMPARISON: March 22, 2018 PET/CT, CT of the abdomen and pelvis dated October 21, 2013



TECHNIQUE: Multiple contiguous axial CT scans were obtained of the abdomen and pelvis

after the administration of intravenous contrast. Coronal and sagittal multiplanar

reformations are submitted for review.  Oral contrast was administered.  Delayed images

were obtained through the abdomen.



FINDINGS:



LUNG BASES: There is a related changes to the right lower lung. There is a large left

pleural effusion with a small right pleural effusion. There is compressive atelectasis of

the left lower lobe.  The patient is status post right mastectomy.



LIVER: The liver is diffusely low in attenuation compared to the spleen. There are

low-attenuation hepatic lesions of the right lobe of liver. Accounting for differences in

technique, these are similar to the previous PET/CT.

BILE DUCTS: There is no intrahepatic or extrahepatic biliary dilatation.

GALLBLADDER: The gallbladder is normal, without pericholecystic inflammatory change.



PANCREAS: There is a cystic lesion of the body of the pancreas best seen on axial image 38

coronal image 44. This measures 1.3 x 1 cm transversely. There is no associated FDG

avidity on the previous PET/CT examination. This has developed compared to the October 21, 2013 examination.

SPLEEN: Normal in size and appearance.



UPPER GI TRACT: Evaluation of the gastrointestinal tract is limited by incomplete gastric

distention. The upper GI tract is unremarkable.

SMALL BOWEL AND MESENTERY: The small bowel is normal in contour, course, and caliber.

There is no obstruction or dilatation.

COLON: There are multiple diverticula of the sigmoid colon. There is no pericolonic

inflammatory change.



ADRENALS: Normal bilaterally.

KIDNEYS: The kidneys are normal in shape, size, contour, and axis. There is no

hydronephrosis or nephrolithiasis.

BLADDER: The bladder is incompletely distended but is grossly normal.



PELVIC ORGANS: The uterus and adnexa are grossly normal for technique.



AORTA: There is calcific atherosclerotic disease of the abdominal aorta and its branches,

without aneurysmal dilatation

IVC: Unremarkable. A right femoral venous port is noted with the tip at the level of the

external iliac vein.



LYMPH NODES: There is no lymphadenopathy by size criteria.



ABDOMINAL WALL: There is no evidence for abdominal wall hernia.

BONES AND SOFT TISSUES: There are multiple sclerotic lesions of the axial skeleton. These

are similar to the previous examination.

OTHER: None



IMPRESSION:

1.  AGAIN NOTED ARE HEPATIC AND OSSEOUS LESIONS CONSISTENT WITH METASTATIC DISEASE.

ATTENDING FOR DIFFERENCES IN TECHNIQUE, THESE ARE STABLE FROM THE MOST RECENT PET/CT

EXAMINATION.

2.  THERE IS A CYSTIC LESION OF THE BODY OF THE PANCREAS. THIS IS NOT FDG AVID ON THE

RECENT PET/CT. THE APPEARANCE IS SUGGESTIVE OF A CYSTIC PANCREATIC NEOPLASM, INCLUDING

SIDE BRANCH INTRADUCTAL PAPILLARY MUCIN PRODUCING NEOPLASM

3.  LEFT GREATER THAN RIGHT PLEURAL EFFUSIONS WITH LEFT BASILAR COMPRESSIVE ATELECTASIS.

4.  FATTY INFILTRATION OF THE LIVER.

5.  ATHEROSCLEROSIS.

6.  DIVERTICULOSIS. Yes